# Patient Record
Sex: FEMALE | Race: WHITE | NOT HISPANIC OR LATINO | Employment: OTHER | URBAN - METROPOLITAN AREA
[De-identification: names, ages, dates, MRNs, and addresses within clinical notes are randomized per-mention and may not be internally consistent; named-entity substitution may affect disease eponyms.]

---

## 2018-10-30 ENCOUNTER — CATARACT CONSULT (OUTPATIENT)
Dept: URBAN - METROPOLITAN AREA CLINIC 43 | Facility: CLINIC | Age: 58
End: 2018-10-30

## 2018-10-30 VITALS
HEART RATE: 72 BPM | RESPIRATION RATE: 14 BRPM | HEIGHT: 55 IN | SYSTOLIC BLOOD PRESSURE: 100 MMHG | DIASTOLIC BLOOD PRESSURE: 64 MMHG

## 2018-10-30 DIAGNOSIS — H43.811: ICD-10-CM

## 2018-10-30 DIAGNOSIS — H35.372: ICD-10-CM

## 2018-10-30 DIAGNOSIS — H35.3110: ICD-10-CM

## 2018-10-30 DIAGNOSIS — H43.22: ICD-10-CM

## 2018-10-30 DIAGNOSIS — H18.51: ICD-10-CM

## 2018-10-30 DIAGNOSIS — H25.812: ICD-10-CM

## 2018-10-30 DIAGNOSIS — H25.811: ICD-10-CM

## 2018-10-30 PROCEDURE — 1036F TOBACCO NON-USER: CPT

## 2018-10-30 PROCEDURE — G9903 PT SCRN TBCO ID AS NON USER: HCPCS

## 2018-10-30 PROCEDURE — V2799I IMPRIMIS

## 2018-10-30 PROCEDURE — 92134 CPTRZ OPH DX IMG PST SGM RTA: CPT

## 2018-10-30 PROCEDURE — 92136TC INTERFEROMETRY - TECHNICAL COMPONENT

## 2018-10-30 PROCEDURE — 92286 ANT SGM IMG I&R SPECLR MIC: CPT

## 2018-10-30 PROCEDURE — 92014 COMPRE OPH EXAM EST PT 1/>: CPT

## 2018-10-30 PROCEDURE — 92025-1 CORNEAL TOPOGRAPHY, INS

## 2018-10-30 PROCEDURE — G8783 BP SCRN PERF REC INTERVAL: HCPCS

## 2018-10-30 PROCEDURE — G8427 DOCREV CUR MEDS BY ELIG CLIN: HCPCS

## 2018-10-30 ASSESSMENT — VISUAL ACUITY
OS_BAT: <20/400
OS_AM: 20/25
OS_SC: 20/400
OD_SC: J1
OS_SC: J2
OD_BAT: <20/400
OS_CC: 20/60
OD_CC: 20/30+2
OD_SC: 20/400
OD_PAM: 20/25

## 2018-10-30 ASSESSMENT — TONOMETRY
OD_IOP_MMHG: 15
OS_IOP_MMHG: 14

## 2022-09-09 NOTE — PATIENT DISCUSSION
ALPRAZOLAM 0.25MG TABLETS      Last Written Prescription Date:  8-31-16  Last Fill Quantity: 60,   # refills: 2  Last Office Visit : 3-7-16  Future Office visit:  none    Routing refill request to provider for review/approval because:  Drug not on the [facility], [facility] or [facility] refill protocol or controlled substance.      Kathleen M Doege RN           The OCT is stable.